# Patient Record
Sex: FEMALE | Race: WHITE | NOT HISPANIC OR LATINO | URBAN - METROPOLITAN AREA
[De-identification: names, ages, dates, MRNs, and addresses within clinical notes are randomized per-mention and may not be internally consistent; named-entity substitution may affect disease eponyms.]

---

## 2018-03-07 ENCOUNTER — INPATIENT (INPATIENT)
Facility: HOSPITAL | Age: 33
LOS: 0 days | Discharge: HOME | DRG: 340 | End: 2018-03-08
Attending: SURGERY | Admitting: SURGERY
Payer: COMMERCIAL

## 2018-03-07 VITALS
DIASTOLIC BLOOD PRESSURE: 67 MMHG | HEART RATE: 93 BPM | OXYGEN SATURATION: 97 % | TEMPERATURE: 98 F | SYSTOLIC BLOOD PRESSURE: 96 MMHG | RESPIRATION RATE: 18 BRPM

## 2018-03-07 DIAGNOSIS — K35.80 UNSPECIFIED ACUTE APPENDICITIS: ICD-10-CM

## 2018-03-07 LAB
ALBUMIN SERPL ELPH-MCNC: 3.4 G/DL — SIGNIFICANT CHANGE UP (ref 3.4–5)
ALP SERPL-CCNC: 62 U/L — SIGNIFICANT CHANGE UP (ref 40–120)
ALT FLD-CCNC: 40 U/L — SIGNIFICANT CHANGE UP (ref 12–42)
ANION GAP SERPL CALC-SCNC: 10 MMOL/L — SIGNIFICANT CHANGE UP (ref 9–16)
APPEARANCE UR: CLEAR — SIGNIFICANT CHANGE UP
APTT BLD: 26.8 SEC — LOW (ref 27.5–36.5)
AST SERPL-CCNC: 42 U/L — HIGH (ref 15–37)
BACTERIA # UR AUTO: PRESENT /HPF
BASOPHILS NFR BLD AUTO: 0.3 % — SIGNIFICANT CHANGE UP (ref 0–2)
BILIRUB SERPL-MCNC: 0.4 MG/DL — SIGNIFICANT CHANGE UP (ref 0.2–1.2)
BILIRUB UR-MCNC: (no result)
BUN SERPL-MCNC: 10 MG/DL — SIGNIFICANT CHANGE UP (ref 7–23)
CALCIUM SERPL-MCNC: 9 MG/DL — SIGNIFICANT CHANGE UP (ref 8.5–10.5)
CHLORIDE SERPL-SCNC: 105 MMOL/L — SIGNIFICANT CHANGE UP (ref 96–108)
CO2 SERPL-SCNC: 24 MMOL/L — SIGNIFICANT CHANGE UP (ref 22–31)
COLOR SPEC: YELLOW — SIGNIFICANT CHANGE UP
CREAT SERPL-MCNC: 0.99 MG/DL — SIGNIFICANT CHANGE UP (ref 0.5–1.3)
DIFF PNL FLD: (no result)
EOSINOPHIL NFR BLD AUTO: 1.5 % — SIGNIFICANT CHANGE UP (ref 0–6)
EPI CELLS # UR: (no result) /HPF
GLUCOSE SERPL-MCNC: 125 MG/DL — HIGH (ref 70–99)
GLUCOSE UR QL: NEGATIVE — SIGNIFICANT CHANGE UP
HCG UR QL: NEGATIVE — SIGNIFICANT CHANGE UP
HCT VFR BLD CALC: 42.1 % — SIGNIFICANT CHANGE UP (ref 34.5–45)
HGB BLD-MCNC: 14.1 G/DL — SIGNIFICANT CHANGE UP (ref 11.5–15.5)
IMM GRANULOCYTES NFR BLD AUTO: 0.3 % — SIGNIFICANT CHANGE UP (ref 0–1.5)
INR BLD: 1.05 — SIGNIFICANT CHANGE UP (ref 0.88–1.16)
KETONES UR-MCNC: (no result) MG/DL
LEUKOCYTE ESTERASE UR-ACNC: NEGATIVE — SIGNIFICANT CHANGE UP
LIDOCAIN IGE QN: 122 U/L — SIGNIFICANT CHANGE UP (ref 73–393)
LYMPHOCYTES # BLD AUTO: 7 % — LOW (ref 13–44)
MCHC RBC-ENTMCNC: 31.1 PG — SIGNIFICANT CHANGE UP (ref 27–34)
MCHC RBC-ENTMCNC: 33.5 G/DL — SIGNIFICANT CHANGE UP (ref 32–36)
MCV RBC AUTO: 92.9 FL — SIGNIFICANT CHANGE UP (ref 80–100)
MONOCYTES NFR BLD AUTO: 4.2 % — SIGNIFICANT CHANGE UP (ref 2–14)
NEUTROPHILS NFR BLD AUTO: 86.7 % — HIGH (ref 43–77)
NITRITE UR-MCNC: NEGATIVE — SIGNIFICANT CHANGE UP
PH UR: 5.5 — SIGNIFICANT CHANGE UP (ref 5–8)
PLATELET # BLD AUTO: 290 K/UL — SIGNIFICANT CHANGE UP (ref 150–400)
POTASSIUM SERPL-MCNC: 3.5 MMOL/L — SIGNIFICANT CHANGE UP (ref 3.5–5.3)
POTASSIUM SERPL-SCNC: 3.5 MMOL/L — SIGNIFICANT CHANGE UP (ref 3.5–5.3)
PROT SERPL-MCNC: 7.4 G/DL — SIGNIFICANT CHANGE UP (ref 6.4–8.2)
PROT UR-MCNC: (no result) MG/DL
PROTHROM AB SERPL-ACNC: 11.6 SEC — SIGNIFICANT CHANGE UP (ref 9.8–12.7)
RBC # BLD: 4.53 M/UL — SIGNIFICANT CHANGE UP (ref 3.8–5.2)
RBC # FLD: 12.5 % — SIGNIFICANT CHANGE UP (ref 10.3–16.9)
RBC CASTS # UR COMP ASSIST: (no result) /HPF
SODIUM SERPL-SCNC: 139 MMOL/L — SIGNIFICANT CHANGE UP (ref 132–145)
SP GR SPEC: >=1.03 — SIGNIFICANT CHANGE UP (ref 1–1.03)
UROBILINOGEN FLD QL: 0.2 E.U./DL — SIGNIFICANT CHANGE UP
WBC # BLD: 10.3 K/UL — SIGNIFICANT CHANGE UP (ref 3.8–10.5)
WBC # FLD AUTO: 10.3 K/UL — SIGNIFICANT CHANGE UP (ref 3.8–10.5)
WBC UR QL: < 5 /HPF — SIGNIFICANT CHANGE UP

## 2018-03-07 PROCEDURE — 99285 EMERGENCY DEPT VISIT HI MDM: CPT | Mod: 25

## 2018-03-07 PROCEDURE — 74177 CT ABD & PELVIS W/CONTRAST: CPT | Mod: 26

## 2018-03-07 RX ORDER — SODIUM CHLORIDE 9 MG/ML
1000 INJECTION INTRAMUSCULAR; INTRAVENOUS; SUBCUTANEOUS ONCE
Qty: 0 | Refills: 0 | Status: COMPLETED | OUTPATIENT
Start: 2018-03-07 | End: 2018-03-07

## 2018-03-07 RX ORDER — CIPROFLOXACIN LACTATE 400MG/40ML
500 VIAL (ML) INTRAVENOUS ONCE
Qty: 0 | Refills: 0 | Status: DISCONTINUED | OUTPATIENT
Start: 2018-03-07 | End: 2018-03-07

## 2018-03-07 RX ORDER — PIPERACILLIN AND TAZOBACTAM 4; .5 G/20ML; G/20ML
3.38 INJECTION, POWDER, LYOPHILIZED, FOR SOLUTION INTRAVENOUS ONCE
Qty: 0 | Refills: 0 | Status: COMPLETED | OUTPATIENT
Start: 2018-03-07 | End: 2018-03-07

## 2018-03-07 RX ORDER — SODIUM CHLORIDE 9 MG/ML
1000 INJECTION, SOLUTION INTRAVENOUS
Qty: 0 | Refills: 0 | Status: DISCONTINUED | OUTPATIENT
Start: 2018-03-07 | End: 2018-03-08

## 2018-03-07 RX ORDER — HYDROMORPHONE HYDROCHLORIDE 2 MG/ML
1 INJECTION INTRAMUSCULAR; INTRAVENOUS; SUBCUTANEOUS EVERY 4 HOURS
Qty: 0 | Refills: 0 | Status: DISCONTINUED | OUTPATIENT
Start: 2018-03-07 | End: 2018-03-08

## 2018-03-07 RX ORDER — HEPARIN SODIUM 5000 [USP'U]/ML
5000 INJECTION INTRAVENOUS; SUBCUTANEOUS EVERY 8 HOURS
Qty: 0 | Refills: 0 | Status: DISCONTINUED | OUTPATIENT
Start: 2018-03-07 | End: 2018-03-08

## 2018-03-07 RX ORDER — MOXIFLOXACIN HYDROCHLORIDE TABLETS, 400 MG 400 MG/1
1 TABLET, FILM COATED ORAL
Qty: 14 | Refills: 0 | OUTPATIENT
Start: 2018-03-07 | End: 2018-03-13

## 2018-03-07 RX ORDER — ONDANSETRON 8 MG/1
4 TABLET, FILM COATED ORAL EVERY 6 HOURS
Qty: 0 | Refills: 0 | Status: DISCONTINUED | OUTPATIENT
Start: 2018-03-07 | End: 2018-03-08

## 2018-03-07 RX ORDER — BUPIVACAINE 13.3 MG/ML
20 INJECTION, SUSPENSION, LIPOSOMAL INFILTRATION ONCE
Qty: 0 | Refills: 0 | Status: DISCONTINUED | OUTPATIENT
Start: 2018-03-07 | End: 2018-03-08

## 2018-03-07 RX ORDER — METRONIDAZOLE 500 MG
1 TABLET ORAL
Qty: 21 | Refills: 0 | OUTPATIENT
Start: 2018-03-07 | End: 2018-03-13

## 2018-03-07 RX ORDER — METRONIDAZOLE 500 MG
TABLET ORAL
Qty: 0 | Refills: 0 | Status: DISCONTINUED | OUTPATIENT
Start: 2018-03-07 | End: 2018-03-07

## 2018-03-07 RX ORDER — METOCLOPRAMIDE HCL 10 MG
10 TABLET ORAL ONCE
Qty: 0 | Refills: 0 | Status: COMPLETED | OUTPATIENT
Start: 2018-03-07 | End: 2018-03-07

## 2018-03-07 RX ORDER — IOHEXOL 300 MG/ML
50 INJECTION, SOLUTION INTRAVENOUS ONCE
Qty: 0 | Refills: 0 | Status: COMPLETED | OUTPATIENT
Start: 2018-03-07 | End: 2018-03-07

## 2018-03-07 RX ORDER — CIPROFLOXACIN LACTATE 400MG/40ML
400 VIAL (ML) INTRAVENOUS EVERY 12 HOURS
Qty: 0 | Refills: 0 | Status: DISCONTINUED | OUTPATIENT
Start: 2018-03-07 | End: 2018-03-07

## 2018-03-07 RX ORDER — PIPERACILLIN AND TAZOBACTAM 4; .5 G/20ML; G/20ML
3.38 INJECTION, POWDER, LYOPHILIZED, FOR SOLUTION INTRAVENOUS EVERY 6 HOURS
Qty: 0 | Refills: 0 | Status: DISCONTINUED | OUTPATIENT
Start: 2018-03-07 | End: 2018-03-08

## 2018-03-07 RX ORDER — MORPHINE SULFATE 50 MG/1
2 CAPSULE, EXTENDED RELEASE ORAL ONCE
Qty: 0 | Refills: 0 | Status: DISCONTINUED | OUTPATIENT
Start: 2018-03-07 | End: 2018-03-07

## 2018-03-07 RX ORDER — METRONIDAZOLE 500 MG
500 TABLET ORAL EVERY 8 HOURS
Qty: 0 | Refills: 0 | Status: DISCONTINUED | OUTPATIENT
Start: 2018-03-07 | End: 2018-03-07

## 2018-03-07 RX ORDER — HYDROMORPHONE HYDROCHLORIDE 2 MG/ML
0.5 INJECTION INTRAMUSCULAR; INTRAVENOUS; SUBCUTANEOUS EVERY 4 HOURS
Qty: 0 | Refills: 0 | Status: DISCONTINUED | OUTPATIENT
Start: 2018-03-07 | End: 2018-03-08

## 2018-03-07 RX ORDER — ONDANSETRON 8 MG/1
4 TABLET, FILM COATED ORAL ONCE
Qty: 0 | Refills: 0 | Status: COMPLETED | OUTPATIENT
Start: 2018-03-07 | End: 2018-03-07

## 2018-03-07 RX ORDER — METRONIDAZOLE 500 MG
500 TABLET ORAL ONCE
Qty: 0 | Refills: 0 | Status: DISCONTINUED | OUTPATIENT
Start: 2018-03-07 | End: 2018-03-07

## 2018-03-07 RX ADMIN — SODIUM CHLORIDE 2000 MILLILITER(S): 9 INJECTION INTRAMUSCULAR; INTRAVENOUS; SUBCUTANEOUS at 04:39

## 2018-03-07 RX ADMIN — HEPARIN SODIUM 5000 UNIT(S): 5000 INJECTION INTRAVENOUS; SUBCUTANEOUS at 21:19

## 2018-03-07 RX ADMIN — PIPERACILLIN AND TAZOBACTAM 200 GRAM(S): 4; .5 INJECTION, POWDER, LYOPHILIZED, FOR SOLUTION INTRAVENOUS at 15:55

## 2018-03-07 RX ADMIN — PIPERACILLIN AND TAZOBACTAM 200 GRAM(S): 4; .5 INJECTION, POWDER, LYOPHILIZED, FOR SOLUTION INTRAVENOUS at 21:19

## 2018-03-07 RX ADMIN — PIPERACILLIN AND TAZOBACTAM 200 GRAM(S): 4; .5 INJECTION, POWDER, LYOPHILIZED, FOR SOLUTION INTRAVENOUS at 08:30

## 2018-03-07 RX ADMIN — ONDANSETRON 4 MILLIGRAM(S): 8 TABLET, FILM COATED ORAL at 04:39

## 2018-03-07 RX ADMIN — IOHEXOL 50 MILLILITER(S): 300 INJECTION, SOLUTION INTRAVENOUS at 04:38

## 2018-03-07 RX ADMIN — MORPHINE SULFATE 2 MILLIGRAM(S): 50 CAPSULE, EXTENDED RELEASE ORAL at 05:53

## 2018-03-07 RX ADMIN — Medication 10 MILLIGRAM(S): at 14:52

## 2018-03-07 NOTE — ED PROVIDER NOTE - OBJECTIVE STATEMENT
32 female pt, no hx of med problems, on OCPs, presents with R sided abd pain worsening for the last 2 days. Started w nausea and difuse abd discomfort w some diarhea episodes yesterday morning, then pain localized more so in the RLQ. +chills. no fever, no chest pain, no sob.

## 2018-03-07 NOTE — PROGRESS NOTE ADULT - SUBJECTIVE AND OBJECTIVE BOX
POST-OPERATIVE NOTE    Procedure: laparoscopic appendectomy    Diagnosis/Indication: acute appendicitis    Surgeon: Dr Choudhary    S: Pt has no complaints, nausea controlled with medication. Denies CP, SOB, CERON, calf tenderness. Pain controlled with medication. Denies  vomiting.    O:  T(C): 36.9 (03-07-18 @ 15:07), Max: 37.1 (03-07-18 @ 13:37)  T(F): 98.4 (03-07-18 @ 15:07), Max: 98.8 (03-07-18 @ 13:37)  HR: 62 (03-07-18 @ 15:07) (62 - 80)  BP: 99/61 (03-07-18 @ 15:07) (99/61 - 110/61)  RR: 32 (03-07-18 @ 15:07) (15 - 32)  SpO2: 100% (03-07-18 @ 15:07) (100% - 100%)  Wt(kg): --                        14.1   10.3  )-----------( 290      ( 07 Mar 2018 04:40 )             42.1     03-07    139  |  105  |  10  ----------------------------<  125<H>  3.5   |  24  |  0.99    Ca    9.0      07 Mar 2018 04:40    TPro  7.4  /  Alb  3.4  /  TBili  0.4  /  DBili  x   /  AST  42<H>  /  ALT  40  /  AlkPhos  62  03-07      Gen: NAD, resting comfortably in bed  C/V: NSR  Pulm: Nonlabored breathing, no respiratory distress  Abd: soft, NT/ND dressings cdi  Extrem: WWP, no calf edema or tenderness, SCDs in place      A/P: 32y Female s/p above procedure  Diet: NPO sips/ chips  IVF:   Pain/nausea control  Hardwick  Zosyn  DVT ppx: sqh, scd, oob  Dispo plan: regional

## 2018-03-07 NOTE — H&P ADULT - NSHPLABSRESULTS_GEN_ALL_CORE
14.1   10.3  )-----------( 290      ( 07 Mar 2018 04:40 )             42.1   03-07    139  |  105  |  10  ----------------------------<  125<H>  3.5   |  24  |  0.99    Ca    9.0      07 Mar 2018 04:40    TPro  7.4  /  Alb  3.4  /  TBili  0.4  /  DBili  x   /  AST  42<H>  /  ALT  40  /  AlkPhos  62  03-07      < from: CT Abdomen and Pelvis w/ Oral Cont and w/ IV Cont (03.07.18 @ 07:02) >    FINDINGS: CT of the abdomen and pelvis was performed with the   administration of intravenous contrast. Reconstructions were performed in   the sagittal and coronal planes. No prior studies are available for   comparison.    Evaluation of the lower chest demonstrates normal heart size. There is no   pleural or pericardial effusion. Lower lung parenchyma is unremarkable.   Evaluation of the abdomen demonstrates that the liver, spleen,   gallbladder, pancreas, bilateral adrenal glands and bilateral kidneys are   normal in appearance. Evaluation of the gastrointestinal tract is   negative for bowel thickening or bowel obstruction. Appendix is normal in   appearance.. There is moderate circumferential mural thickening of the   distal terminal ileum and additional l short segment loops of distal   ileum within the pelvis. Evaluation pelvis of the demonstrates the   uterus, bilateral adnexal regions and bladder are normal in appearance.   There is trace final free fluid. No adenopathy. No significant vascular   calcification. The opacified aspects of the hepatic, portal,splenic,   superior mesenteric vein, bilateral renal veins, IVC and bilateral iliac   veins demonstrates no rina intraluminal thrombus. Evaluation of the   osseous structures demonstrates no destructive osseous lesion.          IMPRESSION:    Infectious/inflammatory ileitis.    < end of copied text >

## 2018-03-07 NOTE — CONSULT NOTE ADULT - SUBJECTIVE AND OBJECTIVE BOX
HPI:  32 F with no PMHx nor PSx presented to [facility] with abd pain x3 days. Pain began 3 days ago approximately 3 hrs after eating meal during return trip from skiing [facility] to [place]. Pain began as diffuse "crampy" accompanied by nausea. Yesterday pain migrated to RLQ accompanied by nausea, chills, and diarrhea. She denies fever, constipation, dysuria, CP, SOB, Cough. Of note, her  experienced similar symptoms over the weekend.     Upon arrival at [facility] she was found to be afebrile, normocardic, normotensive. WBC 10.3 with 86.7% neutrophils CT initial read showed terminal ileitis, attending over read indicated early appendicitis. She was transferred to [facility] for further evaluation and treatment, now s/p Ex lap appendectomy.      PAST MEDICAL & SURGICAL HISTORY:  No pertinent past medical history  No significant past surgical history        REVIEW OF SYSTEMS:    General:  no weakness; no fevers, no chills  Skin/Breast: no rash  Respiratory and Thorax: no SOB, no cough  Cardiovascular:	No chest pain  Gastrointestinal:	 no nausea, vomiting , diarrhea x 1 2 days ago  Genitourinary:	no dysuria, no difficulty urinating, no hematuria  Musculoskeletal:	no weakness, no joint swelling/pain  Neurological:	no focal weakness/numbness  Endocrine: no polyuria, no polydipsia      ANTIBIOTICS:  MEDICATIONS  (STANDING):  BUpivacaine liposome 1.3% Injectable (no eMAR) 20 milliLiter(s) Local Injection once  heparin  Injectable 5000 Unit(s) SubCutaneous every 8 hours  lactated ringers. 1000 milliLiter(s) (100 mL/Hr) IV Continuous <Continuous>  piperacillin/tazobactam IVPB. 3.375 Gram(s) IV Intermittent every 6 hours    MEDICATIONS  (PRN):  HYDROmorphone  Injectable 0.5 milliGRAM(s) IV Push every 4 hours PRN Moderate Pain  HYDROmorphone  Injectable 1 milliGRAM(s) IV Push every 4 hours PRN Severe Pain  ondansetron Injectable 4 milliGRAM(s) IV Push every 6 hours PRN Nausea and/or Vomiting      Allergies: No Known Allergies    SOCIAL HISTORY: no smoking, no ETOH    FAMILY HISTORY:  No pertinent family history in first degree relatives      Vital Signs Last 24 Hrs  T(C): 36.1 (07 Mar 2018 15:47), Max: 37.1 (07 Mar 2018 13:37)  T(F): 97 (07 Mar 2018 15:47), Max: 98.8 (07 Mar 2018 13:37)  HR: 73 (07 Mar 2018 15:47) (62 - 93)  BP: 99/56 (07 Mar 2018 15:47) (96/67 - 110/61)  BP(mean): 73 (07 Mar 2018 15:07) (73 - 78)  RR: 14 (07 Mar 2018 15:47) (14 - 21)  SpO2: 100% (07 Mar 2018 15:47) (97% - 100%)    03-07-18 @ 07:01  -  03-07-18 @ 17:31  --------------------------------------------------------  IN: 400 mL / OUT: 725 mL / NET: -325 mL        PHYSICAL EXAM:  Constitutional: Well-developed, well nourished  Eyes:DAMON, EOMI  Ear/Nose/Throat: no oral lesion, no sinus tenderness on percussion	  Neck:no JVD, no lymphadenopathy, supple  Respiratory: CTA eric  Cardiovascular: S1S2 RRR, no murmurs  Gastrointestinal: appropriate RLQ post-op tenderness  Extremities: no e/e/c  Vascular: DP Pulse: right normal; left normal            LABS:                        14.1   10.3  )-----------( 290      ( 07 Mar 2018 04:40 )             42.1     03-07    139  |  105  |  10  ----------------------------<  125<H>  3.5   |  24  |  0.99    Ca    9.0      07 Mar 2018 04:40    TPro  7.4  /  Alb  3.4  /  TBili  0.4  /  DBili  x   /  AST  42<H>  /  ALT  40  /  AlkPhos  62  03-07    PT/INR - ( 07 Mar 2018 08:03 )   PT: 11.6 sec;   INR: 1.05          PTT - ( 07 Mar 2018 08:03 )  PTT:26.8 sec  Urinalysis Basic - ( 07 Mar 2018 04:39 )    Color: Yellow / Appearance: Clear / SG: >=1.030 / pH: x  Gluc: x / Ketone: Trace mg/dL  / Bili: Small / Urobili: 0.2 E.U./dL   Blood: x / Protein: Trace mg/dL / Nitrite: NEGATIVE   Leuk Esterase: NEGATIVE / RBC: 5-10 /HPF / WBC < 5 /HPF   Sq Epi: x / Non Sq Epi: Moderate /HPF / Bacteria: Present /HPF      RADIOLOGY & ADDITIONAL STUDIES:  CT Abdomen and Pelvis w/ Oral Cont and w/ IV Cont (03.07.18 @ 07:02) >    IMPRESSION:    Infectious/inflammatory ileitis.     ** ADDENDUM 03/07/2018  ***    The appendix is minimally dilated and is fluid-filled without associated   periappendiceal edematous infiltration; findings may be secondary to very   early subtle appendicitis.

## 2018-03-07 NOTE — BRIEF OPERATIVE NOTE - PROCEDURE
<<-----Click on this checkbox to enter Procedure Lysis of adhesions  03/07/2018    Active  [id number]  Laparoscopic appendectomy  03/07/2018    Active  [id number]

## 2018-03-07 NOTE — H&P ADULT - NSHPPHYSICALEXAM_GEN_ALL_CORE
Vital Signs Last 24 Hrs  T(C): 36.8 (07 Mar 2018 08:30), Max: 36.8 (07 Mar 2018 06:59)  T(F): 98.2 (07 Mar 2018 08:30), Max: 98.3 (07 Mar 2018 06:59)  HR: 83 (07 Mar 2018 08:30) (83 - 93)  BP: 109/65 (07 Mar 2018 08:30) (96/67 - 109/65)  BP(mean): --  RR: 18 (07 Mar 2018 08:30) (16 - 18)  SpO2: 100% (07 Mar 2018 08:30) (97% - 100%)      General: A/O x4 NAD  Resp: normal work of breathing on RA, CTABL  CV: RRR, No MRG  ABD: soft, non-distended, Tender to RLQ with localized peritonitis

## 2018-03-07 NOTE — H&P ADULT - HISTORY OF PRESENT ILLNESS
32 F with no PMHx nor PSx presented to [facility] with abd pain x3 days. Pain began 3 days ago approximately 3 hrs after eating meal during return trip from skiing trip to [place]. Pain began as diffuse "crampy" accompanied by nausea. Yesterday pain migrated to RLQ accompanied by nausea, chills, and diarrhea. She denies fever, constipation, dysuria 32 F with no PMHx nor PSx presented to [facility] with abd pain x3 days. Pain began 3 days ago approximately 3 hrs after eating meal during return trip from skiing trip to [place]. Pain began as diffuse "crampy" accompanied by nausea. Yesterday pain migrated to RLQ accompanied by nausea, chills, and diarrhea. She denies fever, constipation, dysuria, CP, SOB, Cough. Of note, her  experienced similar symptoms over the weekend.     Upon arrival at [facility] she was found to be afebrile, normocardic, normotensive. WBC 10.3 with 86.7% neutrophils CT initial read showed terminal ileitis. She was transfered to [facility] for further evaluation and treatment. 32 F with no PMHx nor PSx presented to [facility] with abd pain x3 days. Pain began 3 days ago approximately 3 hrs after eating meal during return trip from skiing trip to [place]. Pain began as diffuse "crampy" accompanied by nausea. Yesterday pain migrated to RLQ accompanied by nausea, chills, and diarrhea. She denies fever, constipation, dysuria, CP, SOB, Cough. Of note, her  experienced similar symptoms over the weekend.     Upon arrival at [facility] she was found to be afebrile, normocardic, normotensive. WBC 10.3 with 86.7% neutrophils CT initial read showed terminal ileitis, attending over read indicated early appendicitis. She was transferred to [facility] for further evaluation and treatment.

## 2018-03-07 NOTE — BRIEF OPERATIVE NOTE - OPERATION/FINDINGS
abdomen entered under direct visualization. Inflamed, non-gangrenous, non-perforated appendix identified. Non-inflamed terminal ileum identified.  REMIGIO. Dissection and ligation of mesoappendix. Appendicial stump secured with Endoloop x2 with no visible leak. Abdominal washout preformed.

## 2018-03-07 NOTE — ED PROVIDER NOTE - PROGRESS NOTE DETAILS
CT w enteritis (ileitis), will cover w po abxs, recommend GI follow up, hydration, pro biotics. Initial read enteritis, ileitis, but upon further evaluation deemed most likely early appendicitis since appendix is dilated 8 mm and no contrast intake. Informed pt about change in read, will call Sx for admission. Pt  in RLQ, left shift on CBC. Admit to Sx under Dr Estuardo Rivas, edwin hardy, NPO. IV abx ordered

## 2018-03-07 NOTE — CONSULT NOTE ADULT - ASSESSMENT
31yo F with no PMHx  transferred from [facility] for RLQ abd pain, nausea, and diarrhea, CT shows terminal ileitis vs early acute appendicitis, now s/p Lap appendectomy , nonperforated appendix, no evidence of terminal ileitis

## 2018-03-07 NOTE — H&P ADULT - ASSESSMENT
32 F with no PMHx no PSx transferred from [facility] for RLQ abd pain, nausea, and diarrhea, CT shows terminal ileitis.    -admit to regional, Dr Choudhary  -Pain/ nausea control PRN  -IVF  -NPO  -IV Abx  -Gi consult  -SCD, SQH, OOB  -Plan discussed with Dr Choudhary 32 F with no PMHx no PSx transferred from [facility] for RLQ abd pain, nausea, and diarrhea, CT shows terminal ileitis v early acute appendicitis    -admit to regional, Dr Choudhary  -Pain/ nausea control PRN  -IVF  -NPO  -IV Abx  -Gi consult  -SCD, SQH, OOB  -for OR today  -Plan discussed with Dr Choudhary

## 2018-03-07 NOTE — ED ADULT NURSE REASSESSMENT NOTE - NS ED NURSE REASSESS COMMENT FT1
Transfer of care from [facility]. Pt is laying in bed comfortably, in NAD, reports relief from pain. Pt given IV antibiotics per MD order. Awaiting transfer to [facility]. Will continue to monitor.

## 2018-03-08 ENCOUNTER — TRANSCRIPTION ENCOUNTER (OUTPATIENT)
Age: 33
End: 2018-03-08

## 2018-03-08 VITALS
TEMPERATURE: 99 F | DIASTOLIC BLOOD PRESSURE: 66 MMHG | OXYGEN SATURATION: 100 % | HEART RATE: 71 BPM | SYSTOLIC BLOOD PRESSURE: 99 MMHG | RESPIRATION RATE: 18 BRPM

## 2018-03-08 LAB
ANION GAP SERPL CALC-SCNC: 12 MMOL/L — SIGNIFICANT CHANGE UP (ref 5–17)
BUN SERPL-MCNC: 9 MG/DL — SIGNIFICANT CHANGE UP (ref 7–23)
CALCIUM SERPL-MCNC: 8.7 MG/DL — SIGNIFICANT CHANGE UP (ref 8.4–10.5)
CHLORIDE SERPL-SCNC: 103 MMOL/L — SIGNIFICANT CHANGE UP (ref 96–108)
CO2 SERPL-SCNC: 23 MMOL/L — SIGNIFICANT CHANGE UP (ref 22–31)
CREAT SERPL-MCNC: 0.86 MG/DL — SIGNIFICANT CHANGE UP (ref 0.5–1.3)
GLUCOSE SERPL-MCNC: 95 MG/DL — SIGNIFICANT CHANGE UP (ref 70–99)
HCT VFR BLD CALC: 36.4 % — SIGNIFICANT CHANGE UP (ref 34.5–45)
HGB BLD-MCNC: 12.1 G/DL — SIGNIFICANT CHANGE UP (ref 11.5–15.5)
MAGNESIUM SERPL-MCNC: 2.1 MG/DL — SIGNIFICANT CHANGE UP (ref 1.6–2.6)
MCHC RBC-ENTMCNC: 30.8 PG — SIGNIFICANT CHANGE UP (ref 27–34)
MCHC RBC-ENTMCNC: 33.2 G/DL — SIGNIFICANT CHANGE UP (ref 32–36)
MCV RBC AUTO: 92.6 FL — SIGNIFICANT CHANGE UP (ref 80–100)
PHOSPHATE SERPL-MCNC: 3.2 MG/DL — SIGNIFICANT CHANGE UP (ref 2.5–4.5)
PLATELET # BLD AUTO: 247 K/UL — SIGNIFICANT CHANGE UP (ref 150–400)
POTASSIUM SERPL-MCNC: 4.3 MMOL/L — SIGNIFICANT CHANGE UP (ref 3.5–5.3)
POTASSIUM SERPL-SCNC: 4.3 MMOL/L — SIGNIFICANT CHANGE UP (ref 3.5–5.3)
RBC # BLD: 3.93 M/UL — SIGNIFICANT CHANGE UP (ref 3.8–5.2)
RBC # FLD: 12.7 % — SIGNIFICANT CHANGE UP (ref 10.3–16.9)
SODIUM SERPL-SCNC: 138 MMOL/L — SIGNIFICANT CHANGE UP (ref 135–145)
WBC # BLD: 7 K/UL — SIGNIFICANT CHANGE UP (ref 3.8–10.5)
WBC # FLD AUTO: 7 K/UL — SIGNIFICANT CHANGE UP (ref 3.8–10.5)

## 2018-03-08 PROCEDURE — 84100 ASSAY OF PHOSPHORUS: CPT

## 2018-03-08 PROCEDURE — 80048 BASIC METABOLIC PNL TOTAL CA: CPT

## 2018-03-08 PROCEDURE — 83735 ASSAY OF MAGNESIUM: CPT

## 2018-03-08 PROCEDURE — 81001 URINALYSIS AUTO W/SCOPE: CPT

## 2018-03-08 PROCEDURE — 81025 URINE PREGNANCY TEST: CPT

## 2018-03-08 PROCEDURE — 99285 EMERGENCY DEPT VISIT HI MDM: CPT | Mod: 25

## 2018-03-08 PROCEDURE — 74177 CT ABD & PELVIS W/CONTRAST: CPT

## 2018-03-08 PROCEDURE — 85610 PROTHROMBIN TIME: CPT

## 2018-03-08 PROCEDURE — 85730 THROMBOPLASTIN TIME PARTIAL: CPT

## 2018-03-08 PROCEDURE — 85025 COMPLETE CBC W/AUTO DIFF WBC: CPT

## 2018-03-08 PROCEDURE — 88304 TISSUE EXAM BY PATHOLOGIST: CPT

## 2018-03-08 PROCEDURE — 85027 COMPLETE CBC AUTOMATED: CPT

## 2018-03-08 PROCEDURE — 96374 THER/PROPH/DIAG INJ IV PUSH: CPT | Mod: XU

## 2018-03-08 PROCEDURE — 80053 COMPREHEN METABOLIC PANEL: CPT

## 2018-03-08 PROCEDURE — 96375 TX/PRO/DX INJ NEW DRUG ADDON: CPT

## 2018-03-08 PROCEDURE — 83690 ASSAY OF LIPASE: CPT

## 2018-03-08 PROCEDURE — 36415 COLL VENOUS BLD VENIPUNCTURE: CPT

## 2018-03-08 RX ORDER — ACETAMINOPHEN 500 MG
975 TABLET ORAL EVERY 6 HOURS
Qty: 0 | Refills: 0 | Status: DISCONTINUED | OUTPATIENT
Start: 2018-03-08 | End: 2018-03-08

## 2018-03-08 RX ADMIN — HEPARIN SODIUM 5000 UNIT(S): 5000 INJECTION INTRAVENOUS; SUBCUTANEOUS at 06:44

## 2018-03-08 RX ADMIN — Medication 975 MILLIGRAM(S): at 15:34

## 2018-03-08 RX ADMIN — Medication 975 MILLIGRAM(S): at 15:32

## 2018-03-08 RX ADMIN — PIPERACILLIN AND TAZOBACTAM 200 GRAM(S): 4; .5 INJECTION, POWDER, LYOPHILIZED, FOR SOLUTION INTRAVENOUS at 03:35

## 2018-03-08 RX ADMIN — Medication 975 MILLIGRAM(S): at 09:50

## 2018-03-08 RX ADMIN — PIPERACILLIN AND TAZOBACTAM 200 GRAM(S): 4; .5 INJECTION, POWDER, LYOPHILIZED, FOR SOLUTION INTRAVENOUS at 15:33

## 2018-03-08 RX ADMIN — PIPERACILLIN AND TAZOBACTAM 200 GRAM(S): 4; .5 INJECTION, POWDER, LYOPHILIZED, FOR SOLUTION INTRAVENOUS at 09:56

## 2018-03-08 RX ADMIN — Medication 975 MILLIGRAM(S): at 09:18

## 2018-03-08 NOTE — DISCHARGE NOTE ADULT - CARE PLAN
Principal Discharge DX:	Acute appendicitis, unspecified acute appendicitis type  Goal:	recovery  Assessment and plan of treatment:	Follow up with Dr. Choudhary in 1-2 weeks. Call the office at the number below to schedule your appointment. You may shower; soap and water over incision sites. Do not scrub. Pat dry when done. No tub bathing or swimming until cleared. Keep incision sites out of the sun as scars will darken. Ambulate as tolerated, but no heavy lifting (>10lbs) or strenuous exercise. You may resume regular diet. You should be urinating at least 3-4x per day. Call the office if you experience increasing abdominal pain, nausea, vomiting, or temperature >101 F.  Apply ice at 20 minutes intervals for the next 3 days.  take 2 extra strength tylenol + 1 advil- take all at the same time every 8 hours for the next couple days.   Take Vicodin at bedtime Principal Discharge DX:	Acute appendicitis, unspecified acute appendicitis type  Goal:	recovery  Assessment and plan of treatment:	Follow up with Dr. Choudhary in 1-2 weeks. Call the office at the number below to schedule your appointment. You may shower; soap and water over incision sites. Do not scrub. Pat dry when done. No tub bathing or swimming until cleared. Keep incision sites out of the sun as scars will darken. Ambulate as tolerated, but no heavy lifting (>10lbs) or strenuous exercise. You may resume regular diet. You should be urinating at least 3-4x per day. Call the office if you experience increasing abdominal pain, nausea, vomiting, or temperature >101 F.  Apply ice at 20 minutes intervals for the next 3 days.  take 2 extra strength tylenol + 1 advil- take all at the same time every 8 hours for the next couple days.   Take Vicodin at bedtime.   Continue with a liquid diet until bowel movement.

## 2018-03-08 NOTE — PROGRESS NOTE ADULT - ATTENDING COMMENTS
Abdomen soft, BS+, Flatus+, BM-, tolerating clear liquid diet, wounds is dry, clean, intact, D/C home with F/U in the office.

## 2018-03-08 NOTE — PROGRESS NOTE ADULT - SUBJECTIVE AND OBJECTIVE BOX
o/n: passed TOV  3/7: s/p lap appendectomy, POC WNL, solares to be removed, started on CLD o/n: passed TOV  3/7: s/p lap appendectomy, POC WNL, solares to be removed, started on CLD         Vital Signs Last 24 Hrs  T(C): 36.5 (08 Mar 2018 04:38), Max: 37.1 (07 Mar 2018 13:37)  T(F): 97.7 (08 Mar 2018 04:38), Max: 98.8 (07 Mar 2018 13:37)  HR: 57 (08 Mar 2018 04:38) (57 - 83)  BP: 109/67 (08 Mar 2018 04:38) (99/56 - 110/61)  BP(mean): 73 (07 Mar 2018 15:07) (73 - 78)  RR: 16 (08 Mar 2018 04:38) (14 - 21)  SpO2: 100% (08 Mar 2018 04:38) (100% - 100%)        I&O's Summary    07 Mar 2018 07:01  -  08 Mar 2018 07:00  --------------------------------------------------------  IN: 400 mL / OUT: 905 mL / NET: -505 mL        Gen: NAD  Abd: soft, nt / nd         32 F s/p laparoscopic appendectomty for acute appendicitis  -nausea/ pain control PRN  -IVF  - cld   -Solares  -Zosyn  -scd, oob  - d/c home

## 2018-03-08 NOTE — DISCHARGE NOTE ADULT - HOSPITAL COURSE
32 F with no PMHx nor PSx presented to [facility] with abd pain x3 days. Pain began 3 days ago approximately 3 hrs after eating meal during return trip from skiing trip to [place]. Pain began as diffuse "crampy" accompanied by nausea. Yesterday pain migrated to RLQ accompanied by nausea, chills, and diarrhea. She denies fever, constipation, dysuria, CP, SOB, Cough. Of note, her  experienced similar symptoms over the weekend.  CT read as early appendicitis. Patient was taken to the OR for lap appy, procedure was uncomplicated. Post op course was uneventful, Patient tolerated PO intake, voided adequately and remained hemodynamically stable. At time of discharge patient was stable.

## 2018-03-08 NOTE — DISCHARGE NOTE ADULT - CARE PROVIDER_API CALL
Lenka Choudhary (MD), Surgery  [address], [place] [address]  Phone: [phone]  Fax: [phone]

## 2018-03-08 NOTE — PROGRESS NOTE ADULT - SUBJECTIVE AND OBJECTIVE BOX
INTERVAL HPI/OVERNIGHT EVENTS:   SURGERY ATTENDING    STATUS POST:  Laparoscopic appendectomy    POST OPERATIVE DAY #: 1    SUBJECTIVE:  Flatus: [x ] YES [ ] NO             Bowel Movement: [ ] YES [x ] NO  Pain (0-10):          2  Pain Control Adequate: [x ] YES [ ] NO  Nausea: [ ] YES [x ] NO            Vomiting: [ ] YES [x ] NO  Diarrhea: [ ] YES [x ] NO         Constipation: [ ] YES [x ] NO     Chest Pain: [ ] YES [x ] NO    SOB:  [ ] YES [x ] NO    MEDICATIONS  (STANDING):  BUpivacaine liposome 1.3% Injectable (no eMAR) 20 milliLiter(s) Local Injection once  lactated ringers. 1000 milliLiter(s) (50 mL/Hr) IV Continuous <Continuous>  piperacillin/tazobactam IVPB. 3.375 Gram(s) IV Intermittent every 6 hours    MEDICATIONS  (PRN):  acetaminophen   Tablet. 975 milliGRAM(s) Oral every 6 hours PRN Severe Pain (7 - 10)  ondansetron Injectable 4 milliGRAM(s) IV Push every 6 hours PRN Nausea and/or Vomiting      Vital Signs Last 24 Hrs  T(C): 37.1 (08 Mar 2018 15:55), Max: 37.1 (08 Mar 2018 15:55)  T(F): 98.7 (08 Mar 2018 15:55), Max: 98.7 (08 Mar 2018 15:55)  HR: 71 (08 Mar 2018 15:55) (54 - 71)  BP: 99/66 (08 Mar 2018 15:55) (99/66 - 109/67)  BP(mean): --  RR: 18 (08 Mar 2018 15:55) (15 - 18)  SpO2: 100% (08 Mar 2018 15:55) (98% - 100%)    PHYSICAL EXAM:      Constitutional:    Eyes:    ENMT:    Neck:    Breasts:    Back:    Respiratory:    Cardiovascular:    Gastrointestinal:    Genitourinary:    Rectal:    Extremities:    Vascular:    Neurological:    Skin:    Lymph Nodes:    Musculoskeletal:    Psychiatric:        I&O's Detail    07 Mar 2018 07:01  -  08 Mar 2018 07:00  --------------------------------------------------------  IN:    lactated ringers.: 400 mL  Total IN: 400 mL    OUT:    Indwelling Catheter - Urethral: 725 mL    Voided: 180 mL  Total OUT: 905 mL    Total NET: -505 mL          LABS:                        12.1   7.0   )-----------( 247      ( 08 Mar 2018 07:03 )             36.4     03-08    138  |  103  |  9   ----------------------------<  95  4.3   |  23  |  0.86    Ca    8.7      08 Mar 2018 07:27  Phos  3.2     03-08  Mg     2.1     03-08    TPro  7.4  /  Alb  3.4  /  TBili  0.4  /  DBili  x   /  AST  42<H>  /  ALT  40  /  AlkPhos  62  03-07    PT/INR - ( 07 Mar 2018 08:03 )   PT: 11.6 sec;   INR: 1.05          PTT - ( 07 Mar 2018 08:03 )  PTT:26.8 sec  Urinalysis Basic - ( 07 Mar 2018 04:39 )    Color: Yellow / Appearance: Clear / SG: >=1.030 / pH: x  Gluc: x / Ketone: Trace mg/dL  / Bili: Small / Urobili: 0.2 E.U./dL   Blood: x / Protein: Trace mg/dL / Nitrite: NEGATIVE   Leuk Esterase: NEGATIVE / RBC: 5-10 /HPF / WBC < 5 /HPF   Sq Epi: x / Non Sq Epi: Moderate /HPF / Bacteria: Present /HPF        RADIOLOGY & ADDITIONAL STUDIES:

## 2018-03-08 NOTE — DISCHARGE NOTE ADULT - PATIENT PORTAL LINK FT
You can access the [facility], offered by [facility], by registering with the following website: [email or web address]

## 2018-03-08 NOTE — PROGRESS NOTE ADULT - ASSESSMENT
32 F s/p laparoscopic appendectomty for acute appendicitis    -nausea/ pain control PRN  -IVF  -NPO sips/ chips  -Hardwick  -Zosyn  -scd, sqh, oob

## 2018-03-08 NOTE — DISCHARGE NOTE ADULT - PLAN OF CARE
recovery Follow up with Dr. Choudhary in 1-2 weeks. Call the office at the number below to schedule your appointment. You may shower; soap and water over incision sites. Do not scrub. Pat dry when done. No tub bathing or swimming until cleared. Keep incision sites out of the sun as scars will darken. Ambulate as tolerated, but no heavy lifting (>10lbs) or strenuous exercise. You may resume regular diet. You should be urinating at least 3-4x per day. Call the office if you experience increasing abdominal pain, nausea, vomiting, or temperature >101 F.  Apply ice at 20 minutes intervals for the next 3 days.  take 2 extra strength tylenol + 1 advil- take all at the same time every 8 hours for the next couple days.   Take Vicodin at bedtime Follow up with Dr. Choudhary in 1-2 weeks. Call the office at the number below to schedule your appointment. You may shower; soap and water over incision sites. Do not scrub. Pat dry when done. No tub bathing or swimming until cleared. Keep incision sites out of the sun as scars will darken. Ambulate as tolerated, but no heavy lifting (>10lbs) or strenuous exercise. You may resume regular diet. You should be urinating at least 3-4x per day. Call the office if you experience increasing abdominal pain, nausea, vomiting, or temperature >101 F.  Apply ice at 20 minutes intervals for the next 3 days.  take 2 extra strength tylenol + 1 advil- take all at the same time every 8 hours for the next couple days.   Take Vicodin at bedtime.   Continue with a liquid diet until bowel movement.

## 2018-03-08 NOTE — DISCHARGE NOTE ADULT - MEDICATION SUMMARY - MEDICATIONS TO TAKE
I will START or STAY ON the medications listed below when I get home from the hospital:    hydrocodone-acetaminophen 5 mg-325 mg oral tablet  -- 1 tab(s) by mouth once (at bedtime) x 10 days MDD:1  -- Caution federal law prohibits the transfer of this drug to any person other  than the person for whom it was prescribed.  May cause drowsiness.  Alcohol may intensify this effect.  Use care when operating dangerous machinery.  This product contains acetaminophen.  Do not use  with any other product containing acetaminophen to prevent possible liver damage.  Using more of this medication than prescribed may cause serious breathing problems.    -- Indication: For severe pain I will START or STAY ON the medications listed below when I get home from the hospital:    hydrocodone-acetaminophen 5 mg-325 mg oral tablet  -- 1 tab(s) by mouth once (at bedtime) x 10 days MDD:1  -- Caution federal law prohibits the transfer of this drug to any person other  than the person for whom it was prescribed.  May cause drowsiness.  Alcohol may intensify this effect.  Use care when operating dangerous machinery.  This product contains acetaminophen.  Do not use  with any other product containing acetaminophen to prevent possible liver damage.  Using more of this medication than prescribed may cause serious breathing problems.    -- Indication: For severe pain    amoxicillin-clavulanate 875 mg-125 mg oral tablet  -- 1 tab(s) by mouth 2 times a day MDD:2  -- Finish all this medication unless otherwise directed by prescriber.  Take with food or milk.    -- Indication: For Abx

## 2018-03-09 LAB — SURGICAL PATHOLOGY STUDY: SIGNIFICANT CHANGE UP

## 2018-03-12 DIAGNOSIS — K35.2 ACUTE APPENDICITIS WITH GENERALIZED PERITONITIS: ICD-10-CM

## 2020-02-26 ENCOUNTER — OUTPATIENT (OUTPATIENT)
Dept: OUTPATIENT SERVICES | Facility: HOSPITAL | Age: 35
LOS: 1 days | End: 2020-02-26
Payer: COMMERCIAL

## 2020-02-26 DIAGNOSIS — Z3A.00 WEEKS OF GESTATION OF PREGNANCY NOT SPECIFIED: ICD-10-CM

## 2020-02-26 DIAGNOSIS — O26.899 OTHER SPECIFIED PREGNANCY RELATED CONDITIONS, UNSPECIFIED TRIMESTER: ICD-10-CM

## 2020-02-26 LAB — AMNISURE ROM (RUPTURE OF MEMBRANES): NEGATIVE — SIGNIFICANT CHANGE UP

## 2020-02-26 PROCEDURE — 84112 EVAL AMNIOTIC FLUID PROTEIN: CPT

## 2020-02-26 PROCEDURE — 76818 FETAL BIOPHYS PROFILE W/NST: CPT

## 2020-02-26 PROCEDURE — 99214 OFFICE O/P EST MOD 30 MIN: CPT

## 2020-02-26 PROCEDURE — 83986 ASSAY PH BODY FLUID NOS: CPT

## 2020-03-05 ENCOUNTER — INPATIENT (INPATIENT)
Facility: HOSPITAL | Age: 35
LOS: 1 days | Discharge: HOME | End: 2020-03-07
Attending: OBSTETRICS & GYNECOLOGY | Admitting: OBSTETRICS & GYNECOLOGY
Payer: COMMERCIAL

## 2020-03-05 VITALS — WEIGHT: 143.96 LBS | HEIGHT: 62 IN

## 2020-03-05 DIAGNOSIS — O26.899 OTHER SPECIFIED PREGNANCY RELATED CONDITIONS, UNSPECIFIED TRIMESTER: ICD-10-CM

## 2020-03-05 DIAGNOSIS — Z3A.00 WEEKS OF GESTATION OF PREGNANCY NOT SPECIFIED: ICD-10-CM

## 2020-03-05 LAB
BASOPHILS # BLD AUTO: 0.07 K/UL — SIGNIFICANT CHANGE UP (ref 0–0.2)
BASOPHILS NFR BLD AUTO: 0.7 % — SIGNIFICANT CHANGE UP (ref 0–2)
EOSINOPHIL # BLD AUTO: 0.39 K/UL — SIGNIFICANT CHANGE UP (ref 0–0.5)
EOSINOPHIL NFR BLD AUTO: 3.7 % — SIGNIFICANT CHANGE UP (ref 0–6)
HCT VFR BLD CALC: 33.6 % — LOW (ref 34.5–45)
HGB BLD-MCNC: 11.2 G/DL — LOW (ref 11.5–15.5)
IMM GRANULOCYTES NFR BLD AUTO: 1 % — SIGNIFICANT CHANGE UP (ref 0–1.5)
LYMPHOCYTES # BLD AUTO: 2.39 K/UL — SIGNIFICANT CHANGE UP (ref 1–3.3)
LYMPHOCYTES # BLD AUTO: 22.5 % — SIGNIFICANT CHANGE UP (ref 13–44)
MCHC RBC-ENTMCNC: 30.4 PG — SIGNIFICANT CHANGE UP (ref 27–34)
MCHC RBC-ENTMCNC: 33.3 GM/DL — SIGNIFICANT CHANGE UP (ref 32–36)
MCV RBC AUTO: 91.3 FL — SIGNIFICANT CHANGE UP (ref 80–100)
MONOCYTES # BLD AUTO: 0.67 K/UL — SIGNIFICANT CHANGE UP (ref 0–0.9)
MONOCYTES NFR BLD AUTO: 6.3 % — SIGNIFICANT CHANGE UP (ref 2–14)
NEUTROPHILS # BLD AUTO: 6.97 K/UL — SIGNIFICANT CHANGE UP (ref 1.8–7.4)
NEUTROPHILS NFR BLD AUTO: 65.8 % — SIGNIFICANT CHANGE UP (ref 43–77)
NRBC # BLD: 0 /100 WBCS — SIGNIFICANT CHANGE UP (ref 0–0)
PLATELET # BLD AUTO: 190 K/UL — SIGNIFICANT CHANGE UP (ref 150–400)
RBC # BLD: 3.68 M/UL — LOW (ref 3.8–5.2)
RBC # FLD: 14 % — SIGNIFICANT CHANGE UP (ref 10.3–14.5)
RH IG SCN BLD-IMP: POSITIVE — SIGNIFICANT CHANGE UP
T PALLIDUM AB TITR SER: NEGATIVE — SIGNIFICANT CHANGE UP
WBC # BLD: 10.6 K/UL — HIGH (ref 3.8–10.5)
WBC # FLD AUTO: 10.6 K/UL — HIGH (ref 3.8–10.5)

## 2020-03-05 PROCEDURE — 88307 TISSUE EXAM BY PATHOLOGIST: CPT | Mod: 26

## 2020-03-05 RX ORDER — SODIUM CHLORIDE 9 MG/ML
1000 INJECTION, SOLUTION INTRAVENOUS ONCE
Refills: 0 | Status: COMPLETED | OUTPATIENT
Start: 2020-03-05 | End: 2020-03-05

## 2020-03-05 RX ORDER — FENTANYL/BUPIVACAINE/NS/PF 2MCG/ML-.1
250 PLASTIC BAG, INJECTION (ML) INJECTION
Refills: 0 | Status: DISCONTINUED | OUTPATIENT
Start: 2020-03-05 | End: 2020-03-05

## 2020-03-05 RX ORDER — GLYCERIN ADULT
1 SUPPOSITORY, RECTAL RECTAL AT BEDTIME
Refills: 0 | Status: DISCONTINUED | OUTPATIENT
Start: 2020-03-05 | End: 2020-03-07

## 2020-03-05 RX ORDER — DIPHENHYDRAMINE HCL 50 MG
25 CAPSULE ORAL EVERY 6 HOURS
Refills: 0 | Status: DISCONTINUED | OUTPATIENT
Start: 2020-03-05 | End: 2020-03-07

## 2020-03-05 RX ORDER — PRAMOXINE HYDROCHLORIDE 150 MG/15G
1 AEROSOL, FOAM RECTAL EVERY 4 HOURS
Refills: 0 | Status: DISCONTINUED | OUTPATIENT
Start: 2020-03-05 | End: 2020-03-07

## 2020-03-05 RX ORDER — SIMETHICONE 80 MG/1
80 TABLET, CHEWABLE ORAL EVERY 4 HOURS
Refills: 0 | Status: DISCONTINUED | OUTPATIENT
Start: 2020-03-05 | End: 2020-03-07

## 2020-03-05 RX ORDER — CITRIC ACID/SODIUM CITRATE 300-500 MG
15 SOLUTION, ORAL ORAL ONCE
Refills: 0 | Status: DISCONTINUED | OUTPATIENT
Start: 2020-03-05 | End: 2020-03-05

## 2020-03-05 RX ORDER — OXYTOCIN 10 UNIT/ML
333.33 VIAL (ML) INJECTION
Qty: 20 | Refills: 0 | Status: DISCONTINUED | OUTPATIENT
Start: 2020-03-05 | End: 2020-03-05

## 2020-03-05 RX ORDER — MAGNESIUM HYDROXIDE 400 MG/1
30 TABLET, CHEWABLE ORAL
Refills: 0 | Status: DISCONTINUED | OUTPATIENT
Start: 2020-03-05 | End: 2020-03-07

## 2020-03-05 RX ORDER — AER TRAVELER 0.5 G/1
1 SOLUTION RECTAL; TOPICAL EVERY 4 HOURS
Refills: 0 | Status: DISCONTINUED | OUTPATIENT
Start: 2020-03-05 | End: 2020-03-07

## 2020-03-05 RX ORDER — OXYTOCIN 10 UNIT/ML
1 VIAL (ML) INJECTION
Qty: 30 | Refills: 0 | Status: DISCONTINUED | OUTPATIENT
Start: 2020-03-05 | End: 2020-03-05

## 2020-03-05 RX ORDER — ACETAMINOPHEN 500 MG
1000 TABLET ORAL ONCE
Refills: 0 | Status: COMPLETED | OUTPATIENT
Start: 2020-03-05 | End: 2020-03-05

## 2020-03-05 RX ORDER — HYDROCORTISONE 1 %
1 OINTMENT (GRAM) TOPICAL EVERY 6 HOURS
Refills: 0 | Status: DISCONTINUED | OUTPATIENT
Start: 2020-03-05 | End: 2020-03-07

## 2020-03-05 RX ORDER — DIBUCAINE 1 %
1 OINTMENT (GRAM) RECTAL EVERY 6 HOURS
Refills: 0 | Status: DISCONTINUED | OUTPATIENT
Start: 2020-03-05 | End: 2020-03-07

## 2020-03-05 RX ORDER — BENZOCAINE 10 %
1 GEL (GRAM) MUCOUS MEMBRANE EVERY 6 HOURS
Refills: 0 | Status: DISCONTINUED | OUTPATIENT
Start: 2020-03-05 | End: 2020-03-07

## 2020-03-05 RX ORDER — OXYCODONE HYDROCHLORIDE 5 MG/1
5 TABLET ORAL ONCE
Refills: 0 | Status: DISCONTINUED | OUTPATIENT
Start: 2020-03-05 | End: 2020-03-07

## 2020-03-05 RX ORDER — TETANUS TOXOID, REDUCED DIPHTHERIA TOXOID AND ACELLULAR PERTUSSIS VACCINE, ADSORBED 5; 2.5; 8; 8; 2.5 [IU]/.5ML; [IU]/.5ML; UG/.5ML; UG/.5ML; UG/.5ML
0.5 SUSPENSION INTRAMUSCULAR ONCE
Refills: 0 | Status: DISCONTINUED | OUTPATIENT
Start: 2020-03-05 | End: 2020-03-07

## 2020-03-05 RX ORDER — ACETAMINOPHEN 500 MG
975 TABLET ORAL
Refills: 0 | Status: DISCONTINUED | OUTPATIENT
Start: 2020-03-05 | End: 2020-03-07

## 2020-03-05 RX ORDER — OXYCODONE HYDROCHLORIDE 5 MG/1
5 TABLET ORAL
Refills: 0 | Status: DISCONTINUED | OUTPATIENT
Start: 2020-03-05 | End: 2020-03-07

## 2020-03-05 RX ORDER — AMPICILLIN TRIHYDRATE 250 MG
2 CAPSULE ORAL EVERY 6 HOURS
Refills: 0 | Status: DISCONTINUED | OUTPATIENT
Start: 2020-03-05 | End: 2020-03-07

## 2020-03-05 RX ORDER — KETOROLAC TROMETHAMINE 30 MG/ML
30 SYRINGE (ML) INJECTION ONCE
Refills: 0 | Status: DISCONTINUED | OUTPATIENT
Start: 2020-03-05 | End: 2020-03-05

## 2020-03-05 RX ORDER — LANOLIN
1 OINTMENT (GRAM) TOPICAL EVERY 6 HOURS
Refills: 0 | Status: DISCONTINUED | OUTPATIENT
Start: 2020-03-05 | End: 2020-03-07

## 2020-03-05 RX ORDER — IBUPROFEN 200 MG
600 TABLET ORAL EVERY 6 HOURS
Refills: 0 | Status: COMPLETED | OUTPATIENT
Start: 2020-03-05 | End: 2021-02-01

## 2020-03-05 RX ORDER — SODIUM CHLORIDE 9 MG/ML
3 INJECTION INTRAMUSCULAR; INTRAVENOUS; SUBCUTANEOUS EVERY 8 HOURS
Refills: 0 | Status: DISCONTINUED | OUTPATIENT
Start: 2020-03-05 | End: 2020-03-07

## 2020-03-05 RX ORDER — OXYTOCIN 10 UNIT/ML
333.33 VIAL (ML) INJECTION
Qty: 20 | Refills: 0 | Status: DISCONTINUED | OUTPATIENT
Start: 2020-03-05 | End: 2020-03-07

## 2020-03-05 RX ORDER — SODIUM CHLORIDE 9 MG/ML
1000 INJECTION, SOLUTION INTRAVENOUS
Refills: 0 | Status: DISCONTINUED | OUTPATIENT
Start: 2020-03-05 | End: 2020-03-05

## 2020-03-05 RX ORDER — GENTAMICIN SULFATE 40 MG/ML
250 VIAL (ML) INJECTION ONCE
Refills: 0 | Status: COMPLETED | OUTPATIENT
Start: 2020-03-05 | End: 2020-03-05

## 2020-03-05 RX ADMIN — Medication 30 MILLIGRAM(S): at 22:51

## 2020-03-05 RX ADMIN — SODIUM CHLORIDE 4000 MILLILITER(S): 9 INJECTION, SOLUTION INTRAVENOUS at 10:46

## 2020-03-05 RX ADMIN — SODIUM CHLORIDE 125 MILLILITER(S): 9 INJECTION, SOLUTION INTRAVENOUS at 17:06

## 2020-03-05 RX ADMIN — Medication 400 MILLIGRAM(S): at 21:30

## 2020-03-05 RX ADMIN — Medication 1000 MILLIUNIT(S)/MIN: at 20:11

## 2020-03-05 RX ADMIN — Medication 216 GRAM(S): at 21:30

## 2020-03-05 RX ADMIN — Medication 200 MILLIGRAM(S): at 22:51

## 2020-03-05 RX ADMIN — Medication 1 MILLIUNIT(S)/MIN: at 12:29

## 2020-03-05 RX ADMIN — SODIUM CHLORIDE 125 MILLILITER(S): 9 INJECTION, SOLUTION INTRAVENOUS at 09:00

## 2020-03-05 RX ADMIN — SODIUM CHLORIDE 125 MILLILITER(S): 9 INJECTION, SOLUTION INTRAVENOUS at 08:12

## 2020-03-06 RX ORDER — IBUPROFEN 200 MG
600 TABLET ORAL EVERY 6 HOURS
Refills: 0 | Status: DISCONTINUED | OUTPATIENT
Start: 2020-03-06 | End: 2020-03-07

## 2020-03-06 RX ADMIN — Medication 600 MILLIGRAM(S): at 11:51

## 2020-03-06 RX ADMIN — Medication 216 GRAM(S): at 09:52

## 2020-03-06 RX ADMIN — Medication 975 MILLIGRAM(S): at 15:20

## 2020-03-06 RX ADMIN — SODIUM CHLORIDE 3 MILLILITER(S): 9 INJECTION INTRAMUSCULAR; INTRAVENOUS; SUBCUTANEOUS at 06:08

## 2020-03-06 RX ADMIN — Medication 975 MILLIGRAM(S): at 09:20

## 2020-03-06 RX ADMIN — Medication 975 MILLIGRAM(S): at 10:19

## 2020-03-06 RX ADMIN — SODIUM CHLORIDE 3 MILLILITER(S): 9 INJECTION INTRAMUSCULAR; INTRAVENOUS; SUBCUTANEOUS at 15:50

## 2020-03-06 RX ADMIN — Medication 975 MILLIGRAM(S): at 23:00

## 2020-03-06 RX ADMIN — Medication 1 APPLICATION(S): at 06:45

## 2020-03-06 RX ADMIN — Medication 600 MILLIGRAM(S): at 07:33

## 2020-03-06 RX ADMIN — Medication 600 MILLIGRAM(S): at 06:45

## 2020-03-06 RX ADMIN — Medication 600 MILLIGRAM(S): at 12:50

## 2020-03-06 RX ADMIN — Medication 975 MILLIGRAM(S): at 21:45

## 2020-03-06 RX ADMIN — Medication 600 MILLIGRAM(S): at 17:52

## 2020-03-06 RX ADMIN — Medication 216 GRAM(S): at 03:45

## 2020-03-06 RX ADMIN — Medication 975 MILLIGRAM(S): at 04:30

## 2020-03-06 RX ADMIN — SODIUM CHLORIDE 3 MILLILITER(S): 9 INJECTION INTRAMUSCULAR; INTRAVENOUS; SUBCUTANEOUS at 01:44

## 2020-03-06 RX ADMIN — Medication 600 MILLIGRAM(S): at 18:50

## 2020-03-06 RX ADMIN — Medication 975 MILLIGRAM(S): at 04:02

## 2020-03-06 RX ADMIN — Medication 1 SPRAY(S): at 06:45

## 2020-03-06 RX ADMIN — Medication 216 GRAM(S): at 15:19

## 2020-03-06 RX ADMIN — Medication 600 MILLIGRAM(S): at 23:50

## 2020-03-06 RX ADMIN — Medication 975 MILLIGRAM(S): at 16:20

## 2020-03-06 NOTE — LACTATION INITIAL EVALUATION - NS LACT CON REASON FOR REQ
FT baby boy 39+5 weeks s/p vaginal seen 20 hours of life. mom has soft breasts slightly sore nipples with copious expressable colostrum bilaterally. baby has no overt tethering and rhythmic coordinated suck upon oral assessment. mom taught HE with teach back, mom understandings FF colostrum and HE can be done to arouse infant and stimulate breasts. ear/shoulder/hiup alignment and belly to body positioning discussed. infant feeding plan -- ad jozef feeds with no more than 3 hours to elapse between the start of the feeds. reassurance provided, all questions answered primary RN made aware./primaparous mom primaparous mom/FT baby boy 39+5 weeks s/p vaginal seen 20 hours of life. mom has soft breasts slightly sore nipples with copious expressable colostrum bilaterally. baby has no overt tethering and rhythmic coordinated suck upon oral assessment. infant's head noted redness area on top of head. mom taught HE with teach back, mom understandings FF colostrum and HE can be done to arouse infant and stimulate breasts. ear/shoulder/hiup alignment and belly to body positioning discussed. infant feeding plan -- ad jozef feeds with no more than 3 hours to elapse between the start of the feeds. reassurance provided, all questions answered primary RN made aware.

## 2020-03-07 ENCOUNTER — TRANSCRIPTION ENCOUNTER (OUTPATIENT)
Age: 35
End: 2020-03-07

## 2020-03-07 VITALS
SYSTOLIC BLOOD PRESSURE: 100 MMHG | OXYGEN SATURATION: 100 % | RESPIRATION RATE: 17 BRPM | TEMPERATURE: 98 F | HEART RATE: 78 BPM | DIASTOLIC BLOOD PRESSURE: 63 MMHG

## 2020-03-07 PROCEDURE — 36415 COLL VENOUS BLD VENIPUNCTURE: CPT

## 2020-03-07 PROCEDURE — 86850 RBC ANTIBODY SCREEN: CPT

## 2020-03-07 PROCEDURE — 88307 TISSUE EXAM BY PATHOLOGIST: CPT

## 2020-03-07 PROCEDURE — 99214 OFFICE O/P EST MOD 30 MIN: CPT

## 2020-03-07 PROCEDURE — 86901 BLOOD TYPING SEROLOGIC RH(D): CPT

## 2020-03-07 PROCEDURE — 85025 COMPLETE CBC W/AUTO DIFF WBC: CPT

## 2020-03-07 PROCEDURE — 86780 TREPONEMA PALLIDUM: CPT

## 2020-03-07 RX ADMIN — Medication 975 MILLIGRAM(S): at 09:56

## 2020-03-07 RX ADMIN — Medication 975 MILLIGRAM(S): at 03:13

## 2020-03-07 RX ADMIN — Medication 600 MILLIGRAM(S): at 06:36

## 2020-03-07 RX ADMIN — Medication 600 MILLIGRAM(S): at 01:20

## 2020-03-07 RX ADMIN — Medication 1 TABLET(S): at 12:19

## 2020-03-07 RX ADMIN — Medication 975 MILLIGRAM(S): at 15:30

## 2020-03-07 RX ADMIN — Medication 600 MILLIGRAM(S): at 12:19

## 2020-03-07 RX ADMIN — Medication 975 MILLIGRAM(S): at 14:45

## 2020-03-07 RX ADMIN — Medication 975 MILLIGRAM(S): at 03:36

## 2020-03-07 RX ADMIN — Medication 600 MILLIGRAM(S): at 05:49

## 2020-03-07 RX ADMIN — Medication 975 MILLIGRAM(S): at 10:30

## 2020-03-07 RX ADMIN — Medication 600 MILLIGRAM(S): at 19:00

## 2020-03-07 RX ADMIN — Medication 600 MILLIGRAM(S): at 13:00

## 2020-03-07 RX ADMIN — Medication 600 MILLIGRAM(S): at 18:01

## 2020-03-07 NOTE — DISCHARGE NOTE OB - PATIENT PORTAL LINK FT
You can access the FollowMyHealth Patient Portal offered by [facility] by registering at the following website: [email or web address]. By joining [employer]’s FollowMyHealth portal, you will also be able to view your health information using other applications (apps) compatible with our system.

## 2020-03-07 NOTE — PROGRESS NOTE ADULT - ASSESSMENT
A/P 34y s/p , PPD #1  , stable, meeting postpartum milestones   - Pain: well controlled on motrin and tylenol  - Triple I: s/p abx, afebrile  - GI: Tolerating regular diet   - : urinating without difficulty/pain  - DVT prophylaxis: ambulating frequently  - Dispo: PPD 2, unless otherwise specified

## 2020-03-07 NOTE — DISCHARGE NOTE OB - FLU SEASON?
Received message from scheduling that patient declined to schedule appointment.   She is not sure of her schedule.     Referral is NOT in chart at this time- it was received in a 57 page printout with other patient information and cannot be  and scanned right now.     Referral is from Jacquelin Rizzo NP and Yennifer Nunez MD at [facility].   Referral for diabetes management in patient with uncontrolled T2D who is now pregnant.     EMR indicates patient lives in [place].   Working with scheduling on timing and location, it appears she can get in with Christine Aguilar next week, there are openings at [facility] on the 26, 27, and 28 of Dec.  Patient likely should be scheduled 1:1 vs class if she already has a meter, is on DB med, etc.    Called [facility] again an got voice mail, left general message:  ~ calling from [place], we have a referral from Jacquelin Rizzo and Dr. Nunez to see you  ~ when they referred you, the expectation was for you to be seen within the week  ~ I live in [place] too, and the best location is to go to [place], [place] easy [address] when there is no traffic  ~ there are appointments available on Dec 26, 27, or 28 with Christine Aguilar in the morning and afternoon  ~ please call and schedule an appointment for next week (left scheduling number)  ~ I will let doctor know we are trying to get you in next week    Did not say diabetes, pregnancy, or baby in the message.     Called Jacquelin Rizzo at [facility], left message for her assistant, Chang (sounds like sigh).  1) [facility] declined to schedule  2) I called patient back, we can see her next week  3) I am faxing referral to you (at #[phone]), can Jacquelin please sign and fax back? Then we will have referral and protocol to work under if patient comes in    Demetria Arora RD, LD, CDE       Yes...

## 2020-03-07 NOTE — DISCHARGE NOTE OB - CARE PROVIDER_API CALL
Perla Urbina)  Obstetrics and Gynecology  [address], [address]  [place] [address]  Phone: [phone]  Fax: [phone]  Follow Up Time:

## 2020-03-07 NOTE — DISCHARGE NOTE OB - MEDICATION SUMMARY - MEDICATIONS TO STOP TAKING
I will STOP taking the medications listed below when I get home from the hospital:    hydrocodone-acetaminophen 5 mg-325 mg oral tablet  -- 1 tab(s) by mouth once (at bedtime) x 10 days MDD:1  -- Caution federal law prohibits the transfer of this drug to any person other  than the person for whom it was prescribed.  May cause drowsiness.  Alcohol may intensify this effect.  Use care when operating dangerous machinery.  This product contains acetaminophen.  Do not use  with any other product containing acetaminophen to prevent possible liver damage.  Using more of this medication than prescribed may cause serious breathing problems.

## 2020-03-07 NOTE — PROGRESS NOTE ADULT - SUBJECTIVE AND OBJECTIVE BOX
Patient evaluated at bedside this morning, resting comfortable in bed, no acute events overnight.  She reports pain is well controlled with tylenol and motrin.  She denies headache, dizziness, chest pain, palpitations, shortness of breath, nausea, vomiting, heavy vaginal bleeding or perineal discomfort. Reports decrease in amount of vaginal bleeding and denies clots.  She has been ambulating without assistance, voiding spontaneously, and is breastfeeding.   Tolerating food well, without nausea/vomit.  Passing flatus.     Physical Exam:  T(C): 36.6 (03-06-20 @ 18:00), Max: 36.6 (03-06-20 @ 18:00)  HR: 105 (03-06-20 @ 18:00) (105 - 105)  BP: 106/67 (03-06-20 @ 18:00) (106/67 - 106/67)  RR: 17 (03-06-20 @ 18:00) (17 - 17)  SpO2: 100% (03-06-20 @ 18:00) (100% - 100%)    GA: NAD, A&O x 3  CV: RRR, no murmurs, rubs, or gallops  Pulm: clear breath sounds throughout, no rales, rhonchi, wheezes  Abd: + BS, soft, nontender, nondistended, no rebound or guarding, uterus firm at midline and below umbilicus  Extremities: no swelling or calf tenderness  Perineum: normal lochia, intact, healing well, no hematoma                          11.2   10.60 )-----------( 190      ( 05 Mar 2020 08:42 )             33.6           acetaminophen   Tablet .. 975 milliGRAM(s) Oral <User Schedule>  benzocaine 20%/menthol 0.5% Spray 1 Spray(s) Topical every 6 hours PRN  dibucaine 1% Ointment 1 Application(s) Topical every 6 hours PRN  diphenhydrAMINE 25 milliGRAM(s) Oral every 6 hours PRN  diphtheria/tetanus/pertussis (acellular) Vaccine (ADAcel) 0.5 milliLiter(s) IntraMuscular once  glycerin Suppository - Adult 1 Suppository(s) Rectal at bedtime PRN  hydrocortisone 1% Cream 1 Application(s) Topical every 6 hours PRN  ibuprofen  Tablet. 600 milliGRAM(s) Oral every 6 hours  lanolin Ointment 1 Application(s) Topical every 6 hours PRN  magnesium hydroxide Suspension 30 milliLiter(s) Oral two times a day PRN  oxyCODONE    IR 5 milliGRAM(s) Oral every 3 hours PRN  oxyCODONE    IR 5 milliGRAM(s) Oral once PRN  oxytocin Infusion 333.333 milliUNIT(s)/Min IV Continuous <Continuous>  pramoxine 1%/zinc 5% Cream 1 Application(s) Topical every 4 hours PRN  prenatal multivitamin 1 Tablet(s) Oral daily  simethicone 80 milliGRAM(s) Chew every 4 hours PRN  sodium chloride 0.9% lock flush 3 milliLiter(s) IV Push every 8 hours  witch hazel Pads 1 Application(s) Topical every 4 hours PRN

## 2020-03-11 DIAGNOSIS — Z90.49 ACQUIRED ABSENCE OF OTHER SPECIFIED PARTS OF DIGESTIVE TRACT: ICD-10-CM

## 2020-03-11 DIAGNOSIS — O41.1230 CHORIOAMNIONITIS, THIRD TRIMESTER, NOT APPLICABLE OR UNSPECIFIED: ICD-10-CM

## 2020-03-11 DIAGNOSIS — Z28.09 IMMUNIZATION NOT CARRIED OUT BECAUSE OF OTHER CONTRAINDICATION: ICD-10-CM

## 2020-03-11 DIAGNOSIS — N87.9 DYSPLASIA OF CERVIX UTERI, UNSPECIFIED: ICD-10-CM

## 2020-03-11 DIAGNOSIS — Z79.2 LONG TERM (CURRENT) USE OF ANTIBIOTICS: ICD-10-CM

## 2020-03-11 DIAGNOSIS — Z3A.39 39 WEEKS GESTATION OF PREGNANCY: ICD-10-CM

## 2020-03-11 DIAGNOSIS — Z79.891 LONG TERM (CURRENT) USE OF OPIATE ANALGESIC: ICD-10-CM

## 2020-03-11 DIAGNOSIS — Z67.10 TYPE A BLOOD, RH POSITIVE: ICD-10-CM

## 2020-03-11 DIAGNOSIS — O34.43 MATERNAL CARE FOR OTHER ABNORMALITIES OF CERVIX, THIRD TRIMESTER: ICD-10-CM

## 2020-03-12 LAB — SURGICAL PATHOLOGY STUDY: SIGNIFICANT CHANGE UP

## 2021-12-18 NOTE — ED ADULT TRIAGE NOTE - SOURCE OF INFORMATION
Arterial Line      Patient reassessed immediately prior to procedure    Patient location during procedure: OR  Start time: 12/18/2021 5:36 AM  Stop Time:12/18/2021 5:41 AM       Line placed for hemodynamic monitoring.  Performed By   Anesthesiologist: Christos Diehl MD  Preanesthetic Checklist  Completed: patient identified, IV checked, site marked, risks and benefits discussed, surgical consent, monitors and equipment checked, pre-op evaluation and timeout performed  Arterial Line Prep   Sterile Tech: cap, gloves, gown, mask and sterile barriers  Prep: ChloraPrep  Patient monitoring: blood pressure monitoring, continuous pulse oximetry and EKG  Arterial Line Procedure   Laterality:left  Location:  radial artery  Catheter size: 20 G   Guidance: ultrasound guided  PROCEDURE NOTE/ULTRASOUND INTERPRETATION.  Using ultrasound guidance the potential vascular sites for insertion of the catheter were visualized to determine the patency of the vessel to be used for vascular access.  After selecting the appropriate site for insertion, the needle was visualized under ultrasound being inserted into the radial artery, followed by ultrasound confirmation of wire and catheter placement. There were no abnormalities seen on ultrasound; an image was taken; and the patient tolerated the procedure with no complications.   Number of attempts: 1  Successful placement: yes  Post Assessment   Dressing Type: secured with tape and wrist guard applied.   Complications no  Circ/Move/Sens Assessment: normal.   Patient Tolerance: patient tolerated the procedure well with no apparent complications             Patient

## 2023-04-29 ENCOUNTER — APPOINTMENT (OUTPATIENT)
Dept: ULTRASOUND IMAGING | Facility: HOSPITAL | Age: 38
End: 2023-04-29

## 2023-04-29 ENCOUNTER — OUTPATIENT (OUTPATIENT)
Dept: OUTPATIENT SERVICES | Facility: HOSPITAL | Age: 38
LOS: 1 days | End: 2023-04-29
Payer: COMMERCIAL

## 2023-04-29 PROBLEM — Z00.00 ENCOUNTER FOR PREVENTIVE HEALTH EXAMINATION: Status: ACTIVE | Noted: 2023-04-29

## 2023-04-29 PROCEDURE — 76536 US EXAM OF HEAD AND NECK: CPT | Mod: 26

## 2023-04-29 PROCEDURE — 76536 US EXAM OF HEAD AND NECK: CPT

## 2023-04-29 NOTE — PROGRESS NOTE ADULT - I HAVE PERSONALLY SEEN, EXAMINED, AND PARTICIPATED IN THE CARE OF THIS PATIENT.  I HAVE REVIEWED ALL PERTINENT CLINICAL INFORMATION, INCLUDING HISTORY, PHYSICAL EXAM, PLAN AND THE MEDICAL/PA/NP STUDENT’S NOTE AND AGREE EXCEPT AS NOTED.
[facility]   Physical Therapy Treatment     Name: Casandra [facility] Number: [id number]    Therapy Diagnosis:   No diagnosis found.      Physician: Marcos Borja MD    Visit Date: 4/27/2023    Physician Orders: PT Eval and Treat  Medical Diagnosis from Referral: Chronic bilateral low back pain with bilateral sciatica [M54.42, M54.41, G89.29], Sacroiliac joint dysfunction of both sides [M53.3], Right cervical radiculopathy [M54.12]  Evaluation Date: 12/8/2022  Authorization Period Expiration: 11/28/2023  Plan of Care Expiration: 5/8/2023                  Progress Update: 5/8/2023     Visit # / Visits authorized: 17/20 (+1 for evaluation)   FOTO: Visit #1 1/13/2022 - Scored: 1 / 3      [facility]: [place]      PRECAUTIONS: Standard Precautions      MD Follow up: No scheduled follow-up    PTA Visit #: 0/5     Time In: 1003  Time Out: 1105  Total Billable Time: 58 minutes    SUBJECTIVE     Pt reports:  her back had been feeling pretty good until yesterday and states that pain was so bad that it was difficult for her to move     Compliance with Hep: Every Other Day  Response to previous treatment: felt better with decreased low back pain   Functional change: Better    Pain: 5/10 shoulder,   6/10 back   Location: R shoulder and B low back.      OBJECTIVE     Objective Measures updated at progress report unless specified otherwise.    Treatment     Casandra received the treatments listed below:       MANUAL THERAPY TECHNIQUES were applied for (15) minutes, including:    Soft tissue mobilization:   bilateral  glutes, piriformis and deep hip rotators   Joint mobilizations: DEFERRED  Lumbar roll grade V   Functional dry needling:   Palpation Assessment to determine the necessity for Functional Dry Needling of Bilateral  glutes, piriformis with electrical stimulation.   See EMR under MEDIA for written consent provided 4/18/2023.         THERAPEUTIC EXERCISES: to develop  strength, endurance, range of motion, flexibility, posture and core stabilization for (15)  minutes including: x = performed today    Performed Today:     Upright bike level 8 for 5 minutes   Foam rolling glutes/piriformis: 2 minutes each side   Stretches   Ravalli pose 2x30s B   Wall slides flexion with self assist 15x Interventions DEFERRED today     Series 6 on half foam:   Stretches   Pec corner stretch 10x10s holds   Prayer stretch 3 minutes x 10s holds   Glute stretch 2x30s B            NEUROMUSCULAR RE-EDUCATION activities to improve: Coordination, Kinesthetic, Sense, and Proprioception for (20) minutes.   The following activities were included: x = exercises performed     Performed Today:     Standing Clamshells 3x10 B   Plank cone taps (2 cones) 3x8 B   Shoulder external rotation to neutral YELLOW 3x10   Shoulder internal rotation walk outs- YELLOW 3x10  SL RDL 15# 3x10 on L   Staggered RDL 3x10 on R  Interventions DEFERRED today     Tabletop leg extensions GREEN band at feet 2x10 B   Dead bug + bridge isometric 3x6 B   Wall angels 3x10   Table push ups 3x10   Anterior carries 15# 3 laps   Side plank (modified with knees flexed) 2x20s B  Rows 10# 3x10   Shoulder extensions 7.5# 3x10          THERAPEUTIC ACTIVITIES to improve dynamic and functional  performance for (8) minutes including:    Performed Today:     Single leg hip thrusts 3x10 B   TRX squat + row 3x10      Interventions DEFERRED today            Next Session:         ASSESSMENT     Pt demonstrates limited tolerance for session today and therefore was unable to tolerate progressions.    Response to Manual Therapy: incorporated functional dry needling and soft tissue mobilization with significant reduction in muscle tension and mild improvement in pain noted.   Response to Therapeutic Exercise: continue to incorporated foam rolling and pigeon pose stretch to improve soft tissue mobility throughout the posterolateral hip   Response to Neuromuscular  Re-education: no significant progressions incorporated due to symptoms. Casandra continues to have pain and difficulty with resisted shoulder rotation interventions   Response to Therapeutic Activity: no significant changes incorporated today due to symptoms; both interventions tolerated well     Casandra is progressing well towards her goals.   Pt prognosis is Good.     Pt will continue to benefit from skilled outpatient physical therapy to address the deficits listed in the problem list box on initial evaluation, provide pt/family education and to maximize pt's level of independence in the home and community environment.     Pt's spiritual, cultural and educational needs considered and pt agreeable to plan of care and goals.    Anticipated barriers to physical therapy: increased for cueing    SHORT TERM GOALS:  updated (2/8/23) Progress Date Met   Recent signs and systems trend is improving in order to progress towards Long term goals.  [x] Met  [] Not Met  [] Progressing 1/13/22   Patient will be independent with Home Exercise Program in order to further progress and return to maximal function. [x] Met  [] Not Met  [] Progressing 3/7/2023   Pain rating at Worst: 5 /10 in order to progress towards increased independence with activity. [x] Met  [] Not Met  [] Progressing 3/7/2023   Patient will be able to correct postural deviations in sitting and standing, to decrease pain and promote postural awareness for injury prevention.  [x] Met  [] Not Met  [] Progressing 1/13/22   Patient will improve functional outcome (FOTO) score: by 5% to increase self-worth & perceived functional ability towards long term goals [] Met  [] Not Met  [x] Progressing       Updated LONG TERM GOALS: (5/8/23) Progress Date Met   Patient will return to normal activites of daily living, recreational, and work related activities with less pain and limitation.  [] Met  [] Not Met  [x] Progressing    Patient will improve range of motion  to stated  goals in order to return to maximal functional potential.  [] Met  [] Not Met  [x] Progressing    Patient will improve Strength to stated goals of appropriate musculature in order to improve functional independence.  [] Met  [] Not Met  [x] Progressing    Pain Rating at Best: 1/10 to improve Quality of Life.  [] Met  [] Not Met  [x] Progressing    Patient will meet predicted functional outcome (FOTO) score: 28% to increase self-worth & perceived functional ability. [] Met  [] Not Met  [x] Progressing    Patient will have met/partially met personal goal of: decrease pain to increase functional with daily and recreational activities.  [] Met  [] Not Met  [x] Progressing         PLAN     Updated Plan of care Certification: 4/27/2023 to 5/8/2023.    Outpatient Physical Therapy 2 times weekly for 8 weeks to include any combination of the following interventions: virtual visits, dry needling, modalities, electrical stimulation (IFC, Pre-Mod, Attended with Functional Dry Needling), Cervical/Lumbar Traction, Gait Training, Manual Therapy, Neuromuscular Re-ed, Patient Education, Self Care, Therapeutic Exercise, and Therapeutic Activites     Asmiat Miller, PT                                 Statement Selected
